# Patient Record
Sex: FEMALE | Race: BLACK OR AFRICAN AMERICAN
[De-identification: names, ages, dates, MRNs, and addresses within clinical notes are randomized per-mention and may not be internally consistent; named-entity substitution may affect disease eponyms.]

---

## 2018-11-09 ENCOUNTER — HOSPITAL ENCOUNTER (OUTPATIENT)
Dept: HOSPITAL 53 - M SFHCLERA | Age: 23
End: 2018-11-09
Attending: PHYSICIAN ASSISTANT
Payer: COMMERCIAL

## 2018-11-09 DIAGNOSIS — J02.9: Primary | ICD-10-CM

## 2018-11-29 ENCOUNTER — HOSPITAL ENCOUNTER (EMERGENCY)
Dept: HOSPITAL 53 - M ED | Age: 23
Discharge: HOME | End: 2018-11-29
Payer: COMMERCIAL

## 2018-11-29 DIAGNOSIS — Z88.1: ICD-10-CM

## 2018-11-29 DIAGNOSIS — S01.81XA: Primary | ICD-10-CM

## 2018-11-29 DIAGNOSIS — Z88.8: ICD-10-CM
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
## 2018-11-29 DIAGNOSIS — Z88.2: ICD-10-CM
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
## 2018-11-29 PROCEDURE — 70450 CT HEAD/BRAIN W/O DYE: CPT

## 2018-11-29 RX ADMIN — LIDOCAINE HYDROCHLORIDE,EPINEPHRINE BITARTRATE 1 ML: 10; .01 INJECTION, SOLUTION INFILTRATION; PERINEURAL at 07:30

## 2018-11-29 RX ADMIN — HYDROCODONE BITARTRATE AND ACETAMINOPHEN 1 TAB: 5; 325 TABLET ORAL at 08:45

## 2018-12-06 ENCOUNTER — HOSPITAL ENCOUNTER (EMERGENCY)
Dept: HOSPITAL 53 - M ED | Age: 23
Discharge: HOME | End: 2018-12-06
Payer: COMMERCIAL

## 2018-12-06 DIAGNOSIS — Z48.02: Primary | ICD-10-CM

## 2018-12-06 PROCEDURE — 99282 EMERGENCY DEPT VISIT SF MDM: CPT

## 2018-12-15 ENCOUNTER — HOSPITAL ENCOUNTER (OUTPATIENT)
Dept: HOSPITAL 53 - M LAB REF | Age: 23
End: 2018-12-15
Attending: NURSE PRACTITIONER
Payer: COMMERCIAL

## 2018-12-15 DIAGNOSIS — Z20.2: Primary | ICD-10-CM

## 2018-12-15 LAB
CHLAMYDIA DNA AMPLIFICATION: NEGATIVE
N GONORRHOEA RRNA SPEC QL NAA+PROBE: NEGATIVE

## 2019-01-25 ENCOUNTER — HOSPITAL ENCOUNTER (OUTPATIENT)
Dept: HOSPITAL 53 - M SFHCLERA | Age: 24
End: 2019-01-25
Attending: NURSE PRACTITIONER
Payer: COMMERCIAL

## 2019-01-25 DIAGNOSIS — N89.8: Primary | ICD-10-CM

## 2019-01-25 LAB
CHLAMYDIA DNA AMPLIFICATION: NEGATIVE
N GONORRHOEA RRNA SPEC QL NAA+PROBE: NEGATIVE

## 2019-02-25 ENCOUNTER — HOSPITAL ENCOUNTER (OUTPATIENT)
Dept: HOSPITAL 53 - M LAB REF | Age: 24
End: 2019-02-25
Attending: PHYSICIAN ASSISTANT
Payer: COMMERCIAL

## 2019-02-25 DIAGNOSIS — J02.9: Primary | ICD-10-CM

## 2019-07-15 ENCOUNTER — HOSPITAL ENCOUNTER (OUTPATIENT)
Dept: HOSPITAL 53 - M LDO | Age: 24
Discharge: HOME | End: 2019-07-15
Attending: OBSTETRICS & GYNECOLOGY
Payer: COMMERCIAL

## 2019-07-15 VITALS — DIASTOLIC BLOOD PRESSURE: 54 MMHG | SYSTOLIC BLOOD PRESSURE: 90 MMHG

## 2019-07-15 VITALS — SYSTOLIC BLOOD PRESSURE: 95 MMHG | DIASTOLIC BLOOD PRESSURE: 58 MMHG

## 2019-07-15 VITALS — BODY MASS INDEX: 27.75 KG/M2 | HEIGHT: 67 IN | WEIGHT: 176.81 LBS

## 2019-07-15 DIAGNOSIS — R68.83: ICD-10-CM

## 2019-07-15 DIAGNOSIS — R11.0: ICD-10-CM

## 2019-07-15 DIAGNOSIS — O26.893: Primary | ICD-10-CM

## 2019-07-15 DIAGNOSIS — R10.30: ICD-10-CM

## 2019-07-15 DIAGNOSIS — Z3A.29: ICD-10-CM

## 2019-07-15 PROCEDURE — 59025 FETAL NON-STRESS TEST: CPT

## 2019-07-15 NOTE — IPNPDOC
Text Note


Date of Service


The patient was seen on 7/15/19.





NOTE


Triage Note





Wanda is a 24yo  with SIUP at approx 29wk presenting with myriad vague 

complaints including occasional sharp pains in her abdomen, "chills" without 

fevers, body cramps, nausea. No vomiting, no diarrhea. No upper respiratory 

symptoms. No vaginal bleeding, LOF or regular ctx. No dysuria, no abnormal 

vaginal discharge. Good fetal movement. 





Vitals wnl, afebrile


General: WDWN, NAD, resting in bed comfortably


Abdomen: soft, gravid, NTTP 


Extremities: no edema of BLE





Cat I FHRT with +accels, -decels, mod tianna


Rivers: no uterine activity





Assessment: Wanda is a 24yo  with SIUP at approx 29wk with vague symptoms.

Vitals wnl, benign exam with no e/o labor or appendicitis/chorio/etc. Reassuring

fetal status. 





Plan:


-Safe for discharge home


-Keep next OB appt


-Rx zofran to [facility] 


-Encouraged adequate hydration


-Return precautions discussed, pt instructed to call the office if she develops 

any focal symptoms 


-work note given for today and tomorrow off of work





Dr. Inga Evans MD





[facility]


[facility]





Vital Signs








  Date Time  Temp Pulse Resp B/P (MAP) Pulse Ox O2 Delivery O2 Flow Rate FiO2


 


7/15/19 14:49  85 18 95/58 (70)    


 


7/15/19 13:59 96.9       

















Inga Evans MD           Jul 15, 2019 16:56